# Patient Record
Sex: MALE | Race: WHITE | NOT HISPANIC OR LATINO | URBAN - METROPOLITAN AREA
[De-identification: names, ages, dates, MRNs, and addresses within clinical notes are randomized per-mention and may not be internally consistent; named-entity substitution may affect disease eponyms.]

---

## 2019-02-27 ENCOUNTER — APPOINTMENT (RX ONLY)
Dept: URBAN - METROPOLITAN AREA CLINIC 11 | Facility: CLINIC | Age: 39
Setting detail: DERMATOLOGY
End: 2019-02-27

## 2019-02-27 DIAGNOSIS — Z41.1 ENCOUNTER FOR COSMETIC SURGERY: ICD-10-CM

## 2019-02-27 PROCEDURE — ? CONSULTATION - FILLERS

## 2019-02-27 PROCEDURE — 99005: CPT

## 2019-02-27 PROCEDURE — ? CONSULTATION - RHINOPLASTY

## 2019-02-27 ASSESSMENT — LOCATION ZONE DERM: LOCATION ZONE: NOSE

## 2019-02-27 ASSESSMENT — LOCATION SIMPLE DESCRIPTION DERM: LOCATION SIMPLE: NOSE

## 2019-02-27 ASSESSMENT — LOCATION DETAILED DESCRIPTION DERM: LOCATION DETAILED: NASAL DORSUM

## 2019-02-27 NOTE — PROCEDURE: CONSULTATION - FILLERS
Brows Filler Primary Volume In Cc (Estimated): 0
Send Procedure Quote As Charge: No
Detail Level: Simple

## 2019-04-12 ENCOUNTER — APPOINTMENT (RX ONLY)
Dept: URBAN - METROPOLITAN AREA CLINIC 11 | Facility: CLINIC | Age: 39
Setting detail: DERMATOLOGY
End: 2019-04-12

## 2019-04-12 DIAGNOSIS — Z41.9 ENCOUNTER FOR PROCEDURE FOR PURPOSES OTHER THAN REMEDYING HEALTH STATE, UNSPECIFIED: ICD-10-CM

## 2019-04-12 PROCEDURE — Z1031: HCPCS

## 2019-04-12 PROCEDURE — ? FILLERS

## 2019-04-12 ASSESSMENT — LOCATION SIMPLE DESCRIPTION DERM: LOCATION SIMPLE: NOSE

## 2019-04-12 ASSESSMENT — LOCATION ZONE DERM: LOCATION ZONE: NOSE

## 2019-04-12 ASSESSMENT — LOCATION DETAILED DESCRIPTION DERM: LOCATION DETAILED: NASAL DORSUM

## 2019-04-12 NOTE — PROCEDURE: FILLERS
Additional Area 5 Volume In Cc: 0
Topical Anesthetic #3: phenenylephrine
Topical Anesthetic Base: ointment
Topical Anesthesia?: yes
Expiration Date (Month Year): 2020-04-15
Price $ (Numeric Only, No Text Please.): 850.00
Detail Level: Simple
Topical Anesthetic #1: lidocaine
Consent: Written consent obtained. Risks include but not limited to bruising, beading, irregular texture, ulceration, infection, allergic reaction, scar formation, incomplete augmentation, temporary nature, procedural pain.
Administered By (Optional): Dr. Pollock
Filler: Juvederm Voluma XC
Topical Anesthetic #2: prilocaine
Lot #: [id number]
Additional Area 1 Location: dorsal hump (nose)
Use Map Statement For Sites (Optional): No
Additional Area 1 Volume In Cc: 0.7

## 2019-09-13 ENCOUNTER — APPOINTMENT (RX ONLY)
Dept: URBAN - METROPOLITAN AREA CLINIC 11 | Facility: CLINIC | Age: 39
Setting detail: DERMATOLOGY
End: 2019-09-13

## 2019-09-13 DIAGNOSIS — L71.8 OTHER ROSACEA: ICD-10-CM

## 2019-09-13 DIAGNOSIS — Z41.9 ENCOUNTER FOR PROCEDURE FOR PURPOSES OTHER THAN REMEDYING HEALTH STATE, UNSPECIFIED: ICD-10-CM

## 2019-09-13 DIAGNOSIS — Z41.1 ENCOUNTER FOR COSMETIC SURGERY: ICD-10-CM

## 2019-09-13 PROCEDURE — ? PRESCRIPTION

## 2019-09-13 PROCEDURE — ? COUNSELING - ROSACEA

## 2019-09-13 PROCEDURE — 99005: CPT

## 2019-09-13 PROCEDURE — ? FILLERS

## 2019-09-13 PROCEDURE — ? CONSULTATION - AESTHETIC FACIAL DEFORMITY

## 2019-09-13 PROCEDURE — ? SKIN CARE REGIMEN

## 2019-09-13 RX ORDER — METRONIDAZOLE 10 MG/G
GEL TOPICAL
Qty: 1 | Refills: 2 | Status: ERX | COMMUNITY
Start: 2019-09-13

## 2019-09-13 RX ADMIN — METRONIDAZOLE: 10 GEL TOPICAL at 16:58

## 2019-09-13 ASSESSMENT — LOCATION DETAILED DESCRIPTION DERM
LOCATION DETAILED: RIGHT CENTRAL MALAR CHEEK
LOCATION DETAILED: LEFT CENTRAL MALAR CHEEK

## 2019-09-13 ASSESSMENT — LOCATION ZONE DERM: LOCATION ZONE: FACE

## 2019-09-13 ASSESSMENT — LOCATION SIMPLE DESCRIPTION DERM
LOCATION SIMPLE: RIGHT CHEEK
LOCATION SIMPLE: LEFT CHEEK

## 2019-09-13 NOTE — PROCEDURE: FILLERS
Brows Filler Volume In Cc: 0
Lot #: [id number]
Topical Anesthetic #1: lidocaine
Use Map Statement For Sites (Optional): No
Filler: Juvederm Voluma XC
Cheeks Filler Volume In Cc: 0.3
Topical Anesthetic #2: prilocaine
Topical Anesthesia?: yes
Expiration Date (Month Year): 04/15/2020
Topical Anesthetic #3: phenenylephrine
Detail Level: Simple
Consent: Written consent obtained. Risks include but not limited to bruising, beading, irregular texture, ulceration, infection, allergic reaction, scar formation, incomplete augmentation, temporary nature, procedural pain.
Topical Anesthetic Base: ointment
Administered By (Optional): Dr. Pollock

## 2020-01-31 ENCOUNTER — APPOINTMENT (RX ONLY)
Dept: URBAN - METROPOLITAN AREA CLINIC 11 | Facility: CLINIC | Age: 40
Setting detail: DERMATOLOGY
End: 2020-01-31

## 2020-01-31 DIAGNOSIS — Z41.9 ENCOUNTER FOR PROCEDURE FOR PURPOSES OTHER THAN REMEDYING HEALTH STATE, UNSPECIFIED: ICD-10-CM

## 2020-01-31 PROCEDURE — ? JEUVEAU

## 2020-01-31 ASSESSMENT — LOCATION DETAILED DESCRIPTION DERM: LOCATION DETAILED: GLABELLA

## 2020-01-31 ASSESSMENT — LOCATION SIMPLE DESCRIPTION DERM: LOCATION SIMPLE: GLABELLA

## 2020-01-31 ASSESSMENT — LOCATION ZONE DERM: LOCATION ZONE: FACE

## 2020-01-31 NOTE — PROCEDURE: JEUVEAU
Additional Area 2 Units: 0
Lot #: [id number]
Glabellar Complex Units: 35
Post-Care Instructions: Patient instructed to not lie down for 4 hours and limit physical activity for 24 hours.
Detail Level: Simple
Consent: Written consent obtained. Risks include but not limited to lid/brow ptosis, bruising, swelling, diplopia, temporary effect, incomplete chemical denervation.
Dilution (U/0.1 Cc): 5
Expiration Date (Month Year): 2021/11

## 2020-01-31 NOTE — HPI: COSMETIC (BOTOX)
Have You Had Botox?: has not had botox
Have You Had Dysport?: has not had Dysport
Have You Had Xeomin?: has not had Xeomin